# Patient Record
Sex: FEMALE | Race: BLACK OR AFRICAN AMERICAN | Employment: FULL TIME | URBAN - NONMETROPOLITAN AREA
[De-identification: names, ages, dates, MRNs, and addresses within clinical notes are randomized per-mention and may not be internally consistent; named-entity substitution may affect disease eponyms.]

---

## 2023-03-23 ENCOUNTER — APPOINTMENT (OUTPATIENT)
Dept: GENERAL RADIOLOGY | Age: 53
End: 2023-03-23
Attending: EMERGENCY MEDICINE
Payer: MEDICAID

## 2023-03-23 ENCOUNTER — HOSPITAL ENCOUNTER (EMERGENCY)
Age: 53
Discharge: HOME | End: 2023-03-23
Attending: EMERGENCY MEDICINE
Payer: MEDICAID

## 2023-03-23 VITALS
HEART RATE: 99 BPM | DIASTOLIC BLOOD PRESSURE: 89 MMHG | BODY MASS INDEX: 23.3 KG/M2 | WEIGHT: 145 LBS | SYSTOLIC BLOOD PRESSURE: 173 MMHG | HEIGHT: 66 IN | TEMPERATURE: 97.7 F | RESPIRATION RATE: 17 BRPM | OXYGEN SATURATION: 100 %

## 2023-03-23 DIAGNOSIS — M76.61 TENDONITIS, ACHILLES, RIGHT: Primary | ICD-10-CM

## 2023-03-23 PROCEDURE — 74011250637 HC RX REV CODE- 250/637: Performed by: EMERGENCY MEDICINE

## 2023-03-23 PROCEDURE — 73610 X-RAY EXAM OF ANKLE: CPT

## 2023-03-23 PROCEDURE — 99283 EMERGENCY DEPT VISIT LOW MDM: CPT | Performed by: EMERGENCY MEDICINE

## 2023-03-23 RX ORDER — ACETAMINOPHEN 325 MG/1
650 TABLET ORAL
Qty: 20 TABLET | Refills: 0 | Status: SHIPPED | OUTPATIENT
Start: 2023-03-23

## 2023-03-23 RX ORDER — LIDOCAINE 50 MG/G
PATCH TOPICAL
Qty: 15 EACH | Refills: 0 | Status: SHIPPED | OUTPATIENT
Start: 2023-03-23

## 2023-03-23 RX ORDER — CYCLOBENZAPRINE HCL 10 MG
5 TABLET ORAL
Status: COMPLETED | OUTPATIENT
Start: 2023-03-23 | End: 2023-03-23

## 2023-03-23 RX ORDER — ACETAMINOPHEN 500 MG
1000 TABLET ORAL ONCE
Status: COMPLETED | OUTPATIENT
Start: 2023-03-23 | End: 2023-03-23

## 2023-03-23 RX ORDER — IBUPROFEN 600 MG/1
600 TABLET ORAL
Qty: 20 TABLET | Refills: 0 | Status: SHIPPED | OUTPATIENT
Start: 2023-03-23

## 2023-03-23 RX ORDER — METHOCARBAMOL 750 MG/1
750 TABLET, FILM COATED ORAL
Qty: 15 TABLET | Refills: 0 | Status: SHIPPED | OUTPATIENT
Start: 2023-03-23 | End: 2023-04-06

## 2023-03-23 RX ADMIN — CYCLOBENZAPRINE 5 MG: 10 TABLET, FILM COATED ORAL at 10:05

## 2023-03-23 RX ADMIN — ACETAMINOPHEN 1000 MG: 500 TABLET ORAL at 10:05

## 2023-03-23 NOTE — ED PROVIDER NOTES
[facility] EMERGENCY DEPT  EMERGENCY DEPARTMENT ENCOUNTER       Pt Name: Amber Fernandez  MRN: [id number]  [place] 1970  Date of evaluation: 3/23/2023  Provider: Clair Bal MD   PCP: Katalina Fernandez MD  Note Started: 9:49 AM 3/23/23     CHIEF COMPLAINT       Chief Complaint   Patient presents with    Ankle Pain    Foot Pain        HISTORY OF PRESENT ILLNESS: 1 or more elements      History From: Patient  HPI Limitations : None     Amber Fernandez is a 52 y.o. female who presents with right ankle and foot pain since last weekend. States started hurting randomely. No trauma or injury noted. Worse since last nigth. Not better with aleve. Nursing Notes were all reviewed and agreed with or any disagreements were addressed in the HPI. REVIEW OF SYSTEMS      Review of Systems   Constitutional:  Negative for appetite change and fever. HENT: Negative. Eyes: Negative. Respiratory: Negative. Negative for shortness of breath. Gastrointestinal:  Negative for abdominal pain and nausea. Genitourinary: Negative. Musculoskeletal:  Positive for arthralgias, gait problem and myalgias. Negative for joint swelling. Skin:  Negative for color change, rash and wound. Neurological:  Negative for weakness and numbness. Psychiatric/Behavioral:  The patient is not nervous/anxious. Positives and Pertinent negatives as per HPI. PAST HISTORY     Past Medical History:  Past Medical History:   Diagnosis Date    Diabetes mellitus ([address]     Hypertension associated with diabetes ([facility] [address]        Past Surgical History:  No past surgical history on file. Family History:  History reviewed. No pertinent family history.     Social History:  Social History     Tobacco Use    Smoking status: Heavy Smoker     Packs/day: 2.00     Types: Cigarettes    Smokeless tobacco: Never   Substance Use Topics    Alcohol use: Never    Drug use: Never       Allergies:  No Known Allergies    CURRENT MEDICATIONS Previous Medications    CYCLOBENZAPRINE (FLEXERIL) 10 MG TABLET    Take 1 Tablet by mouth two (2) times a day. METFORMIN (GLUCOPHAGE) 500 MG TABLET    Take 500 mg by mouth two (2) times daily (with meals). SCREENINGS               No data recorded         PHYSICAL EXAM      ED Triage Vitals [03/23/23 0843]   ED Encounter Vitals Group      BP (!) 173/89      Pulse (Heart Rate) 99      Resp Rate 17      Temp 97.7 °F (36.5 °C)      Temp src       O2 Sat (%) 100 %      Weight 145 lb      Height 5' 6\"        Physical Exam  Vitals and nursing note reviewed. Constitutional:       Appearance: Normal appearance. HENT:      Head: Normocephalic. Right Ear: External ear normal.      Left Ear: External ear normal.      Nose: Nose normal.      Mouth/Throat:      Mouth: Mucous membranes are moist.   Eyes:      Pupils: Pupils are equal, round, and reactive to light. Cardiovascular:      Rate and Rhythm: Normal rate. Pulses: Normal pulses. Heart sounds: Normal heart sounds. Pulmonary:      Effort: Pulmonary effort is normal.      Breath sounds: Normal breath sounds. Abdominal:      General: Abdomen is flat. Musculoskeletal:         General: Tenderness present. Normal range of motion. Cervical back: Normal range of motion. Comments: TTP over achilles neg homans sign. Skin:     Capillary Refill: Capillary refill takes less than 2 seconds. Findings: No erythema or rash. Neurological:      General: No focal deficit present. Mental Status: She is alert.            DIAGNOSTIC RESULTS         RADIOLOGY:  Non-plain film images such as CT, Ultrasound and MRI are read by the radiologist. Erinn Randall radiographic images are visualized and preliminarily interpreted by the ED Provider with the below findings:     No fracure or dislocation     Interpretation per the Radiologist below, if available at the time of this note:     XR ANKLE RT MIN 3 V    Result Date: 3/23/2023  EXAM: XR ANKLE RT MIN 3 V INDICATION: Ankle pain COMPARISON: None. FINDINGS: Three views of the right ankle. The bones are osteopenic. The ankle mortise is intact. There is chronic deformity of the medial malleolus. There are Achilles and plantar calcaneal enthesophytes. These lie formation is seen from the base of the fifth metatarsal. No significant tibiotalar joint effusion. No acute abnormality. PROCEDURES   Unless otherwise noted below, none  Procedures     CRITICAL CARE TIME   Not met    EMERGENCY DEPARTMENT COURSE and DIFFERENTIAL DIAGNOSIS/MDM   Vitals:    Vitals:    03/23/23 0843   BP: (!) 173/89   Pulse: 99   Resp: 17   Temp: 97.7 °F (36.5 °C)   SpO2: 100%   Weight: 65.8 kg (145 lb)   Height: 5' 6\" (1.676 m)        Patient was given the following medications:  Medications - No data to display    CONSULTS: (Who and What was discussed)  None    Chronic Conditions:   Past Medical History:   Diagnosis Date    Diabetes mellitus ([facility] [address]     Hypertension associated with diabetes ([facility] [address]         Social Determinants affecting Dx or Tx: None    Records Reviewed (source and summary of external notes): None    CC/HPI Summary, DDx, ED Course, and Reassessment: PT with right foot/leg pain noted after a a recent trip to [place]. Clinically appears to be an achiles tendonitis. No clinical signs of rupture. Likely due to increased walking while on trip. Symptom managemetn and followup discussed. Disposition Considerations (Tests not done, Shared Decision Making, Pt Expectation of Test or Tx.): SDM as above. FINAL IMPRESSION     1. Tendonitis, Achilles, right          DISPOSITION/PLAN       Discharge Note: The patient is stable for discharge home. The signs, symptoms, diagnosis, and discharge instructions have been discussed, understanding conveyed, and agreed upon. The patient is to follow up as recommended or return to ER should their symptoms worsen.       PATIENT REFERRED TO:  Follow-up Information       Follow up With Specialties Details Why Contact Info    Luis Herrera MD Orthopedic Surgery Go in 1 week If symptoms persist or worsen [address] [facility]      Dillan Farias MD Internal Medicine Physician   [address]  [phone]      [facility] EMERGENCY DEPT Emergency Medicine Go to  As needed, or for any concerns or deteriorations. , if symptoms persist or worsen. [address] [facility]  [phone]              DISCHARGE MEDICATIONS:  Discharge Medication List as of 3/23/2023 10:13 AM        START taking these medications    Details   lidocaine (LIDODERM) 5 % Apply patch to the affected area for 12 hours a day and remove for 12 hours a day., Normal, Disp-15 Each, R-0      acetaminophen (TYLENOL) 325 mg tablet Take 2 Tablets by mouth every four (4) hours as needed for Pain., Normal, Disp-20 Tablet, R-0      methocarbamoL (ROBAXIN) 750 mg tablet Take 1 Tablet by mouth three (3) times daily as needed for Muscle Spasm(s) for up to 14 days. , Normal, Disp-15 Tablet, R-0      ibuprofen (MOTRIN) 600 mg tablet Take 1 Tablet by mouth every six (6) hours as needed for Pain., Normal, Disp-20 Tablet, R-0           CONTINUE these medications which have NOT CHANGED    Details   cyclobenzaprine (FLEXERIL) 10 mg tablet Take 1 Tablet by mouth two (2) times a day., Print, Disp-15 Tablet, R-0      metFORMIN (GLUCOPHAGE) 500 mg tablet Take 500 mg by mouth two (2) times daily (with meals). , Historical Med               DISCONTINUED MEDICATIONS:  Discharge Medication List as of 3/23/2023 10:13 AM          I am the Primary Clinician of Record. Yessi Conde MD (electronically signed)    (Please note that parts of this dictation were completed with voice recognition software. Quite often unanticipated grammatical, syntax, homophones, and other interpretive errors are inadvertently transcribed by the computer software. Please disregards these errors. Please excuse any errors that have escaped final proofreading.)

## 2023-03-23 NOTE — Clinical Note
[address]  [facility] EMERGENCY DEPT  [address] [place] [address] [address]  [place] [address] [phone]    Work/School Note    Date: 3/23/2023    To Whom It May concern:    Kinza Collado was seen and treated today in the emergency room by the following provider(s):  Attending Provider: Gertrude Redding MD.      Kinza Collado is excused from work/school on 3/23/2023 through 3/25/2023. She is medically clear to return to work/school on 3/26/2023.          Sincerely,          John Renteria MD

## 2023-09-20 ENCOUNTER — HOSPITAL ENCOUNTER (EMERGENCY)
Facility: HOSPITAL | Age: 53
Discharge: HOME | End: 2023-09-20
Attending: EMERGENCY MEDICINE
Payer: MEDICAID

## 2023-09-20 VITALS
TEMPERATURE: 98.7 F | OXYGEN SATURATION: 100 % | WEIGHT: 133.9 LBS | SYSTOLIC BLOOD PRESSURE: 170 MMHG | BODY MASS INDEX: 21.52 KG/M2 | DIASTOLIC BLOOD PRESSURE: 93 MMHG | HEART RATE: 88 BPM | HEIGHT: 66 IN | RESPIRATION RATE: 16 BRPM

## 2023-09-20 DIAGNOSIS — I10 UNCONTROLLED HYPERTENSION: ICD-10-CM

## 2023-09-20 DIAGNOSIS — E13.42 DIABETIC POLYNEUROPATHY ASSOCIATED WITH OTHER SPECIFIED DIABETES MELLITUS (HCC): Primary | ICD-10-CM

## 2023-09-20 PROCEDURE — 6370000000 HC RX 637 (ALT 250 FOR IP): Performed by: EMERGENCY MEDICINE

## 2023-09-20 PROCEDURE — 99283 EMERGENCY DEPT VISIT LOW MDM: CPT

## 2023-09-20 RX ORDER — CLONIDINE HYDROCHLORIDE 0.2 MG/1
0.2 TABLET ORAL 2 TIMES DAILY
Qty: 60 TABLET | Refills: 0 | Status: SHIPPED | OUTPATIENT
Start: 2023-09-20 | End: 2023-10-20

## 2023-09-20 RX ORDER — GABAPENTIN 100 MG/1
100 CAPSULE ORAL
Status: COMPLETED | OUTPATIENT
Start: 2023-09-20 | End: 2023-09-20

## 2023-09-20 RX ORDER — ACETAMINOPHEN 500 MG
1000 TABLET ORAL
Status: COMPLETED | OUTPATIENT
Start: 2023-09-20 | End: 2023-09-20

## 2023-09-20 RX ORDER — CLONIDINE HYDROCHLORIDE 0.1 MG/1
0.2 TABLET ORAL
Status: COMPLETED | OUTPATIENT
Start: 2023-09-20 | End: 2023-09-20

## 2023-09-20 RX ORDER — GABAPENTIN 100 MG/1
100 CAPSULE ORAL 3 TIMES DAILY
Qty: 90 CAPSULE | Refills: 5 | Status: SHIPPED | OUTPATIENT
Start: 2023-09-20 | End: 2024-03-18

## 2023-09-20 RX ADMIN — GABAPENTIN 100 MG: 100 CAPSULE ORAL at 00:26

## 2023-09-20 RX ADMIN — CLONIDINE HYDROCHLORIDE 0.2 MG: 0.1 TABLET ORAL at 00:26

## 2023-09-20 RX ADMIN — ACETAMINOPHEN 1000 MG: 500 TABLET ORAL at 00:26

## 2023-09-20 ASSESSMENT — PAIN SCALES - GENERAL
PAINLEVEL_OUTOF10: 5
PAINLEVEL_OUTOF10: 5

## 2023-09-20 ASSESSMENT — PAIN DESCRIPTION - PAIN TYPE: TYPE: ACUTE PAIN

## 2023-09-20 ASSESSMENT — PAIN - FUNCTIONAL ASSESSMENT
PAIN_FUNCTIONAL_ASSESSMENT: NONE - DENIES PAIN
PAIN_FUNCTIONAL_ASSESSMENT: 0-10
PAIN_FUNCTIONAL_ASSESSMENT: ACTIVITIES ARE NOT PREVENTED

## 2023-09-20 ASSESSMENT — LIFESTYLE VARIABLES
HOW MANY STANDARD DRINKS CONTAINING ALCOHOL DO YOU HAVE ON A TYPICAL DAY: PATIENT DOES NOT DRINK
HOW OFTEN DO YOU HAVE A DRINK CONTAINING ALCOHOL: NEVER

## 2023-09-20 ASSESSMENT — PAIN DESCRIPTION - LOCATION
LOCATION: HEAD;FOOT
LOCATION: FOOT

## 2023-09-20 NOTE — ED TRIAGE NOTES
Patient reports numbness to toes of both feet. Patient states that she is diabetic. Pt denies any injury/trauma.

## 2023-09-20 NOTE — ED NOTES
Discharge instructions reviewed and pt verbalized understanding. Provider aware of elevated BP and wants to continue with discharge. Education provided for new Rx of clonidine. Pt was able to ambulate out of ED with all belongings.      Alonso Garcia RN  09/20/23 [id number]

## 2024-01-30 ENCOUNTER — HOSPITAL ENCOUNTER (EMERGENCY)
Facility: HOSPITAL | Age: 54
Discharge: HOME | End: 2024-01-30
Attending: EMERGENCY MEDICINE

## 2024-01-30 VITALS
OXYGEN SATURATION: 98 % | HEART RATE: 94 BPM | HEIGHT: 66 IN | DIASTOLIC BLOOD PRESSURE: 98 MMHG | TEMPERATURE: 98.7 F | SYSTOLIC BLOOD PRESSURE: 212 MMHG | BODY MASS INDEX: 20.89 KG/M2 | WEIGHT: 130 LBS | RESPIRATION RATE: 19 BRPM

## 2024-01-30 DIAGNOSIS — E13.42 DIABETIC POLYNEUROPATHY ASSOCIATED WITH OTHER SPECIFIED DIABETES MELLITUS (HCC): ICD-10-CM

## 2024-01-30 PROCEDURE — 99283 EMERGENCY DEPT VISIT LOW MDM: CPT

## 2024-01-30 RX ORDER — GABAPENTIN 300 MG/1
300 CAPSULE ORAL 2 TIMES DAILY
Qty: 28 CAPSULE | Refills: 0 | Status: SHIPPED | OUTPATIENT
Start: 2024-01-30 | End: 2024-02-13

## 2024-01-30 NOTE — ED PROVIDER NOTES
affecting Dx or Tx: None    Smoking Cessation: Not Applicable    PROCEDURES   Unless otherwise noted above, none.  Procedures      CRITICAL CARE TIME   Patient does not meet Critical Care Time, 0 minutes    FINAL IMPRESSION     1. Diabetic polyneuropathy associated with other specified diabetes mellitus (HCC)          DISPOSITION/PLAN   DISPOSITION Decision To Discharge 01/30/2024 02:15:52 PM    Discharge Note: The patient is stable for discharge home. The signs, symptoms, diagnosis, and discharge instructions have been discussed, understanding conveyed, and agreed upon. The patient is to follow up as recommended or return to ER should their symptoms worsen.      PATIENT REFERRED TO:  Calvin Brambila MD  [address] [facility] [address] [phone]              DISCHARGE MEDICATIONS:     Medication List        CHANGE how you take these medications      gabapentin 300 MG capsule  Commonly known as: NEURONTIN  Take 1 capsule by mouth in the morning and at bedtime for 14 days. Intended supply: 30 days Max Daily Amount: 600 mg  What changed:   medication strength  how much to take  when to take this            ASK your doctor about these medications      acetaminophen 325 MG tablet  Commonly known as: TYLENOL     cloNIDine 0.2 MG tablet  Commonly known as: Catapres  Take 1 tablet by mouth 2 times daily     cyclobenzaprine 10 MG tablet  Commonly known as: FLEXERIL     ibuprofen 600 MG tablet  Commonly known as: ADVIL;MOTRIN     lidocaine 5 %  Commonly known as: LIDODERM     metFORMIN 500 MG tablet  Commonly known as: GLUCOPHAGE               Where to Get Your Medications        These medications were sent to [id number] - [place], [place] - [address] [place] [address] - P [phone] - F [phone]  [address] [place] [id number]      Phone: [phone]   gabapentin 300 MG capsule           DISCONTINUED MEDICATIONS:  Current Discharge Medication List          I am the

## 2025-03-03 ENCOUNTER — HOSPITAL ENCOUNTER (EMERGENCY)
Facility: HOSPITAL | Age: 55
Discharge: HOME | End: 2025-03-03
Attending: EMERGENCY MEDICINE

## 2025-03-03 VITALS
HEIGHT: 66 IN | DIASTOLIC BLOOD PRESSURE: 93 MMHG | OXYGEN SATURATION: 97 % | SYSTOLIC BLOOD PRESSURE: 176 MMHG | RESPIRATION RATE: 19 BRPM | HEART RATE: 87 BPM | TEMPERATURE: 98 F | WEIGHT: 140.5 LBS | BODY MASS INDEX: 22.58 KG/M2

## 2025-03-03 DIAGNOSIS — R60.9 DEPENDENT EDEMA: Primary | ICD-10-CM

## 2025-03-03 PROCEDURE — 99282 EMERGENCY DEPT VISIT SF MDM: CPT

## 2025-03-03 ASSESSMENT — PAIN DESCRIPTION - FREQUENCY: FREQUENCY: CONTINUOUS

## 2025-03-03 ASSESSMENT — PAIN DESCRIPTION - ORIENTATION: ORIENTATION: RIGHT;LEFT

## 2025-03-03 ASSESSMENT — PAIN SCALES - GENERAL: PAINLEVEL_OUTOF10: 10

## 2025-03-03 ASSESSMENT — ENCOUNTER SYMPTOMS: RESPIRATORY NEGATIVE: 1

## 2025-03-03 ASSESSMENT — PAIN DESCRIPTION - LOCATION: LOCATION: FOOT

## 2025-03-03 ASSESSMENT — PAIN DESCRIPTION - PAIN TYPE: TYPE: ACUTE PAIN

## 2025-03-03 ASSESSMENT — PAIN - FUNCTIONAL ASSESSMENT: PAIN_FUNCTIONAL_ASSESSMENT: 0-10

## 2025-03-04 NOTE — DISCHARGE INSTRUCTIONS
Elevate both feet above the hip is much as possible.    Look into getting graduated stockings.    Stop smoking.

## 2025-03-04 NOTE — ED NOTES
Patient discharged with family member. Patient verbalized understanding of discharged instruction, medication and follow up. Patient A&OX4  
Patient watching tv,  present in room   
understanding and were provided resources if asked. Counseled patient for approximately 3 minutes.     PROCEDURES   Unless otherwise noted above, none.  Procedures      CRITICAL CARE TIME   Patient does not meet Critical Care Time, 0 minutes    ED IMPRESSION   No diagnosis found.      DISPOSITION/PLAN   DISPOSITION              Discharge Note: The patient is stable for discharge home. The signs, symptoms, diagnosis, and discharge instructions have been discussed, understanding conveyed, and agreed upon. The patient is to follow up as recommended or return to ER should their symptoms worsen.      PATIENT REFERRED TO:  No follow-up provider specified.      DISCHARGE MEDICATIONS:     Medication List        ASK your doctor about these medications      acetaminophen 325 MG tablet  Commonly known as: TYLENOL     cloNIDine 0.2 MG tablet  Commonly known as: Catapres  Take 1 tablet by mouth 2 times daily     cyclobenzaprine 10 MG tablet  Commonly known as: FLEXERIL     gabapentin 300 MG capsule  Commonly known as: NEURONTIN  Take 1 capsule by mouth in the morning and at bedtime for 14 days. Intended supply: 30 days Max Daily Amount: 600 mg     ibuprofen 600 MG tablet  Commonly known as: ADVIL;MOTRIN     lidocaine 5 %  Commonly known as: LIDODERM     metFORMIN 500 MG tablet  Commonly known as: GLUCOPHAGE                DISCONTINUED MEDICATIONS:  Current Discharge Medication List          I am the Primary Clinician of Record: Destin De Leon MD (electronically signed)    (Please note that parts of this dictation were completed with voice recognition software. Quite often unanticipated grammatical, syntax, homophones, and other interpretive errors are inadvertently transcribed by the computer software. Please disregards these errors. Please excuse any errors that have escaped final proofreading.)       Destin De Leon MD  03/04/25 1230

## 2025-03-04 NOTE — ED TRIAGE NOTES
Reports bilateral foot pain and swelling that started last Monday while on a cruise; 2+pitting edema observed to bilateral LE. Hx DM, HTN and has not taken medications in months.